# Patient Record
Sex: MALE | Race: BLACK OR AFRICAN AMERICAN
[De-identification: names, ages, dates, MRNs, and addresses within clinical notes are randomized per-mention and may not be internally consistent; named-entity substitution may affect disease eponyms.]

---

## 2017-03-30 ENCOUNTER — HOSPITAL ENCOUNTER (EMERGENCY)
Dept: HOSPITAL 23 - SED | Age: 27
LOS: 1 days | Discharge: HOME | End: 2017-03-31
Payer: COMMERCIAL

## 2017-03-30 DIAGNOSIS — R10.13: ICD-10-CM

## 2017-03-30 DIAGNOSIS — F41.9: ICD-10-CM

## 2017-03-30 DIAGNOSIS — I10: ICD-10-CM

## 2017-03-30 DIAGNOSIS — R00.2: Primary | ICD-10-CM

## 2017-03-30 DIAGNOSIS — R07.9: ICD-10-CM

## 2017-03-30 LAB
BASOPHIL#: 0 X10E3
BASOPHIL%: 0.5 %
BLOOD UREA NITROGEN: 10 MG/DL
BUN/CREATININE RATIO: 9.09
CALCIUM SERUM: 9.6 MG/DL
CK MB SERPL-RTO: 13.2 %
CK MB SERPL-RTO: 33 G/DL
CREATININE SERUM: 1.1 MG/DL
DIFF IND: NO
EOSINOPHIL#: 0 X10E3
EOSINOPHIL%: 0.7 %
GLOM FILT RATE ESTIMATED: 108.3 ML/MIN
GLUCOSE FASTING: 98 MG/DL
HEMATOCRIT: 42.8 %
HEMOGLOBIN: 14.1 GM/DL
KETONES UR QL: 102 MMOL/L
KETONES UR QL: 30 MMOL/L
LYMPHOCYTE#: 2 X10E3
LYMPHOCYTE%: 38.7 %
MEAN CELL VOLUME: 87.6 FL
MEAN CORPUSCULAR HEMOGLOBIN: 28.9 PG
MEAN PLATELET VOLUME: 8.4 FL
METHADONE UR QL SCN: 47.2 NG/ML
METHADONE UR QL SCN: <1 NG/ML
MICRO INDICATED?: NO
MONOCYTE#: 0.5 X10E3
MONOCYTE%: 9.1 %
NEUTROPHIL#: 2.6 X10E3
NEUTROPHIL%: 51 %
PLATELET COUNT: 204 X10E3
POC - TROPONIN: <0.05 NG/ML
POTASSIUM: 4.1 MMOL/L
RED BLOOD COUNT: 4.88 X10E
SODIUM: 138 MMOL/L
URINE APPEARANCE: CLEAR
URINE BILIRUBIN: (no result)
URINE BLOOD: (no result)
URINE COLOR: YELLOW
URINE GLUCOSE: (no result) MG/DL
URINE KETONE: (no result)
URINE LEUKOCYTE ESTERASE: (no result)
URINE NITRATE: (no result)
URINE PH: 7.5
URINE PROTEIN: (no result)
URINE SOURCE: (no result)
URINE SPECIFIC GRAVITY: 1.01
URINE UROBILINOGEN: 0.2 MG/DL
WHITE BLOOD COUNT: 5.1 X10E3

## 2017-04-30 ENCOUNTER — HOSPITAL ENCOUNTER (EMERGENCY)
Dept: HOSPITAL 23 - SED | Age: 27
LOS: 1 days | Discharge: HOME | End: 2017-05-01
Payer: COMMERCIAL

## 2017-04-30 DIAGNOSIS — J06.9: Primary | ICD-10-CM

## 2017-05-25 ENCOUNTER — HOSPITAL ENCOUNTER (EMERGENCY)
Dept: HOSPITAL 23 - SED | Age: 27
Discharge: HOME | End: 2017-05-25
Payer: COMMERCIAL

## 2017-05-25 DIAGNOSIS — K21.9: Primary | ICD-10-CM

## 2017-05-25 DIAGNOSIS — F41.9: ICD-10-CM

## 2017-05-25 LAB
MICRO INDICATED?: NO
URINE APPEARANCE: CLEAR
URINE BILIRUBIN: (no result)
URINE BLOOD: (no result)
URINE COLOR: YELLOW
URINE GLUCOSE: (no result) MG/DL
URINE KETONE: (no result)
URINE LEUKOCYTE ESTERASE: (no result)
URINE NITRATE: (no result)
URINE PH: 6.5 (ref 5–8)
URINE PROTEIN: (no result)
URINE SOURCE: (no result)
URINE SPECIFIC GRAVITY: <=1.005 (ref 1–1.03)
URINE UROBILINOGEN: 0.2 MG/DL

## 2017-07-08 ENCOUNTER — HOSPITAL ENCOUNTER (EMERGENCY)
Dept: HOSPITAL 23 - SED | Age: 27
Discharge: HOME | End: 2017-07-08
Payer: COMMERCIAL
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
## 2017-07-08 DIAGNOSIS — S70.362A: Primary | ICD-10-CM
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
## 2017-07-08 DIAGNOSIS — I10: ICD-10-CM

## 2017-07-08 LAB
BASOPHIL#: 0 X10E3 (ref 0–0.3)
BASOPHIL%: 0.5 % (ref 0–2.5)
BLOOD UREA NITROGEN: 14 MG/DL (ref 9–23)
BUN/CREATININE RATIO: 12.72
CALCIUM SERUM: 9.2 MG/DL (ref 8.4–10.2)
CK MB SERPL-RTO: 13 % (ref 11–15.5)
CK MB SERPL-RTO: 33.6 G/DL (ref 30–36)
CREATININE SERUM: 1.1 MG/DL (ref 0.6–1.4)
DIFF IND: NO
EOSINOPHIL#: 0.1 X10E3 (ref 0–0.7)
EOSINOPHIL%: 1.8 % (ref 0–7)
GLOM FILT RATE ESTIMATED: 108.3 ML/MIN (ref 60–?)
GLUCOSE FASTING: 99 MG/DL (ref 70–110)
HEMATOCRIT: 42.5 % (ref 38–50)
HEMOGLOBIN: 14.3 GM/DL (ref 13–16)
KETONES UR QL: 101 MMOL/L (ref 100–111)
KETONES UR QL: 28 MMOL/L (ref 22–31)
LYMPHOCYTE#: 1.9 X10E3 (ref 1–3.5)
LYMPHOCYTE%: 32.9 % (ref 17–45)
MEAN CELL VOLUME: 87.5 FL (ref 83–96)
MEAN CORPUSCULAR HEMOGLOBIN: 29.4 PG (ref 28–34)
MEAN PLATELET VOLUME: 8.7 FL (ref 6.5–11.5)
MICRO INDICATED?: NO
MONOCYTE#: 0.5 X10E3 (ref 0–1)
MONOCYTE%: 9.5 % (ref 3–12)
NEUTROPHIL#: 3.2 X10E3 (ref 1.5–7.1)
NEUTROPHIL%: 55.3 % (ref 40–75)
PLATELET COUNT: 180 X10E3 (ref 140–420)
POTASSIUM: 4 MMOL/L (ref 3.5–5.1)
RED BLOOD COUNT: 4.85 X10E (ref 3.9–5.6)
SODIUM: 137 MMOL/L (ref 135–145)
URINE APPEARANCE: CLEAR
URINE BILIRUBIN: (no result)
URINE BLOOD: (no result)
URINE COLOR: YELLOW
URINE GLUCOSE: (no result) MG/DL
URINE KETONE: (no result)
URINE LEUKOCYTE ESTERASE: (no result)
URINE NITRATE: (no result)
URINE PH: 7 (ref 5–8)
URINE PROTEIN: (no result)
URINE SOURCE: (no result)
URINE SPECIFIC GRAVITY: 1.01 (ref 1–1.03)
URINE UROBILINOGEN: 0.2 MG/DL
WHITE BLOOD COUNT: 5.8 X10E3 (ref 4–10.5)

## 2017-07-26 ENCOUNTER — APPOINTMENT (OUTPATIENT)
Dept: GENERAL RADIOLOGY | Facility: HOSPITAL | Age: 27
End: 2017-07-26

## 2017-07-26 ENCOUNTER — APPOINTMENT (OUTPATIENT)
Dept: CARDIOLOGY | Facility: HOSPITAL | Age: 27
End: 2017-07-26

## 2017-07-26 ENCOUNTER — HOSPITAL ENCOUNTER (EMERGENCY)
Facility: HOSPITAL | Age: 27
Discharge: HOME | End: 2017-07-26
Attending: EMERGENCY MEDICINE | Admitting: EMERGENCY MEDICINE

## 2017-07-26 VITALS
BODY MASS INDEX: 28.44 KG/M2 | OXYGEN SATURATION: 99 % | WEIGHT: 210 LBS | HEIGHT: 72 IN | DIASTOLIC BLOOD PRESSURE: 73 MMHG | TEMPERATURE: 98.5 F | SYSTOLIC BLOOD PRESSURE: 137 MMHG | RESPIRATION RATE: 16 BRPM | HEART RATE: 95 BPM

## 2017-07-26 DIAGNOSIS — R06.02 SHORTNESS OF BREATH: ICD-10-CM

## 2017-07-26 DIAGNOSIS — R00.2 PALPITATIONS: Primary | ICD-10-CM

## 2017-07-26 LAB
ALBUMIN SERPL-MCNC: 4.8 G/DL (ref 3.5–5.2)
ALBUMIN/GLOB SERPL: 1.6 G/DL
ALP SERPL-CCNC: 68 U/L (ref 39–117)
ALT SERPL W P-5'-P-CCNC: 28 U/L (ref 1–41)
AMPHET+METHAMPHET UR QL: NEGATIVE
ANION GAP SERPL CALCULATED.3IONS-SCNC: 13.4 MMOL/L
AST SERPL-CCNC: 27 U/L (ref 1–40)
BARBITURATES UR QL SCN: NEGATIVE
BASOPHILS # BLD AUTO: 0.02 10*3/MM3 (ref 0–0.2)
BASOPHILS NFR BLD AUTO: 0.2 % (ref 0–1.5)
BENZODIAZ UR QL SCN: NEGATIVE
BILIRUB SERPL-MCNC: 0.4 MG/DL (ref 0.1–1.2)
BILIRUB UR QL STRIP: NEGATIVE
BUN BLD-MCNC: 9 MG/DL (ref 6–20)
BUN/CREAT SERPL: 8 (ref 7–25)
CALCIUM SPEC-SCNC: 9.5 MG/DL (ref 8.6–10.5)
CANNABINOIDS SERPL QL: NEGATIVE
CHLORIDE SERPL-SCNC: 100 MMOL/L (ref 98–107)
CLARITY UR: CLEAR
CO2 SERPL-SCNC: 26.6 MMOL/L (ref 22–29)
COCAINE UR QL: NEGATIVE
COLOR UR: YELLOW
CREAT BLD-MCNC: 1.13 MG/DL (ref 0.76–1.27)
D DIMER PPP FEU-MCNC: <0.27 MCGFEU/ML (ref 0–0.49)
DEPRECATED RDW RBC AUTO: 37.8 FL (ref 37–54)
EOSINOPHIL # BLD AUTO: 0.12 10*3/MM3 (ref 0–0.7)
EOSINOPHIL NFR BLD AUTO: 1.4 % (ref 0.3–6.2)
ERYTHROCYTE [DISTWIDTH] IN BLOOD BY AUTOMATED COUNT: 11.7 % (ref 11.5–14.5)
GFR SERPL CREATININE-BSD FRML MDRD: 97 ML/MIN/1.73
GLOBULIN UR ELPH-MCNC: 3 GM/DL
GLUCOSE BLD-MCNC: 115 MG/DL (ref 65–99)
GLUCOSE UR STRIP-MCNC: NEGATIVE MG/DL
HCT VFR BLD AUTO: 43 % (ref 40.4–52.2)
HGB BLD-MCNC: 14.4 G/DL (ref 13.7–17.6)
HGB UR QL STRIP.AUTO: NEGATIVE
IMM GRANULOCYTES # BLD: 0 10*3/MM3 (ref 0–0.03)
IMM GRANULOCYTES NFR BLD: 0 % (ref 0–0.5)
KETONES UR QL STRIP: NEGATIVE
LEUKOCYTE ESTERASE UR QL STRIP.AUTO: NEGATIVE
LYMPHOCYTES # BLD AUTO: 3 10*3/MM3 (ref 0.9–4.8)
LYMPHOCYTES NFR BLD AUTO: 35.6 % (ref 19.6–45.3)
MCH RBC QN AUTO: 29.8 PG (ref 27–32.7)
MCHC RBC AUTO-ENTMCNC: 33.5 G/DL (ref 32.6–36.4)
MCV RBC AUTO: 89 FL (ref 79.8–96.2)
METHADONE UR QL SCN: NEGATIVE
MONOCYTES # BLD AUTO: 0.61 10*3/MM3 (ref 0.2–1.2)
MONOCYTES NFR BLD AUTO: 7.2 % (ref 5–12)
NEUTROPHILS # BLD AUTO: 4.68 10*3/MM3 (ref 1.9–8.1)
NEUTROPHILS NFR BLD AUTO: 55.6 % (ref 42.7–76)
NITRITE UR QL STRIP: NEGATIVE
OPIATES UR QL: NEGATIVE
OXYCODONE UR QL SCN: NEGATIVE
PH UR STRIP.AUTO: 6.5 [PH] (ref 5–8)
PLATELET # BLD AUTO: 214 10*3/MM3 (ref 140–500)
PMV BLD AUTO: 10.4 FL (ref 6–12)
POTASSIUM BLD-SCNC: 3.5 MMOL/L (ref 3.5–5.2)
PROT SERPL-MCNC: 7.8 G/DL (ref 6–8.5)
PROT UR QL STRIP: NEGATIVE
RBC # BLD AUTO: 4.83 10*6/MM3 (ref 4.6–6)
SODIUM BLD-SCNC: 140 MMOL/L (ref 136–145)
SP GR UR STRIP: 1.02 (ref 1–1.03)
TROPONIN T SERPL-MCNC: <0.01 NG/ML (ref 0–0.03)
UROBILINOGEN UR QL STRIP: NORMAL
WBC NRBC COR # BLD: 8.43 10*3/MM3 (ref 4.5–10.7)

## 2017-07-26 PROCEDURE — 80307 DRUG TEST PRSMV CHEM ANLYZR: CPT | Performed by: PHYSICIAN ASSISTANT

## 2017-07-26 PROCEDURE — 93010 ELECTROCARDIOGRAM REPORT: CPT | Performed by: INTERNAL MEDICINE

## 2017-07-26 PROCEDURE — 84484 ASSAY OF TROPONIN QUANT: CPT | Performed by: PHYSICIAN ASSISTANT

## 2017-07-26 PROCEDURE — 96374 THER/PROPH/DIAG INJ IV PUSH: CPT

## 2017-07-26 PROCEDURE — 85025 COMPLETE CBC W/AUTO DIFF WBC: CPT | Performed by: PHYSICIAN ASSISTANT

## 2017-07-26 PROCEDURE — 93005 ELECTROCARDIOGRAM TRACING: CPT

## 2017-07-26 PROCEDURE — 81003 URINALYSIS AUTO W/O SCOPE: CPT | Performed by: PHYSICIAN ASSISTANT

## 2017-07-26 PROCEDURE — 99283 EMERGENCY DEPT VISIT LOW MDM: CPT

## 2017-07-26 PROCEDURE — 80053 COMPREHEN METABOLIC PANEL: CPT | Performed by: PHYSICIAN ASSISTANT

## 2017-07-26 PROCEDURE — 85379 FIBRIN DEGRADATION QUANT: CPT | Performed by: PHYSICIAN ASSISTANT

## 2017-07-26 PROCEDURE — 71020 HC CHEST PA AND LATERAL: CPT

## 2017-07-26 PROCEDURE — 0296T HC EXT ECG > 48HR TO 21 DAY RCRD W/CONECT INTL RCRD: CPT

## 2017-07-26 RX ORDER — SODIUM CHLORIDE 0.9 % (FLUSH) 0.9 %
10 SYRINGE (ML) INJECTION AS NEEDED
Status: DISCONTINUED | OUTPATIENT
Start: 2017-07-26 | End: 2017-07-26 | Stop reason: HOSPADM

## 2017-07-26 RX ADMIN — SODIUM CHLORIDE 1000 ML: 9 INJECTION, SOLUTION INTRAVENOUS at 03:02

## 2017-07-26 NOTE — DISCHARGE INSTRUCTIONS
Home, rest, follow up with cardiologist and PCP for recheck and further evaluation.  Return to care with further concerns.

## 2017-07-26 NOTE — ED PROVIDER NOTES
EMERGENCY DEPARTMENT ENCOUNTER    CHIEF COMPLAINT  Chief Complaint: Palpitations  History given by: Patient  History limited by:   Room Number: 02/02  PMD: Ashleigh Galaviz MD      HPI:  Pt is a 26 y.o. male who presents complaining of palpitations that onset 1 week ago. Pt describes the palpitations as a fast rate. He also reports some SOA with episodes. Pt denies any previous hx of palpitations, but reports he has a hx of panic attacks and states these sxs are different. Pt denies any abd pain, N/V. He denies any stimulant use.     Duration: 1 week  Onset: gradual  Timing: intermittent  Location: central chest  Radiation: none  Quality: fast  Intensity/Severity: moderate  Progression: unchanged  Associated Symptoms: SOA  Aggravating Factors: none  Alleviating Factors: none  Previous Episodes: hx of panic attacks. Sxs are different  Treatment before arrival: none    PAST MEDICAL HISTORY  Active Ambulatory Problems     Diagnosis Date Noted   • No Active Ambulatory Problems     Resolved Ambulatory Problems     Diagnosis Date Noted   • No Resolved Ambulatory Problems     Past Medical History:   Diagnosis Date   • Hypertension        PAST SURGICAL HISTORY  History reviewed. No pertinent surgical history.    FAMILY HISTORY  History reviewed. No pertinent family history.    SOCIAL HISTORY  Social History     Social History   • Marital status: Single     Spouse name: N/A   • Number of children: N/A   • Years of education: N/A     Occupational History   • Not on file.     Social History Main Topics   • Smoking status: Never Smoker   • Smokeless tobacco: Not on file   • Alcohol use No   • Drug use: No   • Sexual activity: Not on file     Other Topics Concern   • Not on file     Social History Narrative   • No narrative on file       ALLERGIES  Review of patient's allergies indicates no known allergies.    REVIEW OF SYSTEMS  Review of Systems   Constitutional: Negative for activity change, appetite change and fever.   HENT:  Negative for congestion and sore throat.    Eyes: Negative.    Respiratory: Negative for cough and shortness of breath.    Cardiovascular: Positive for palpitations. Negative for chest pain and leg swelling.   Gastrointestinal: Negative for abdominal pain, diarrhea and vomiting.   Endocrine: Negative.    Genitourinary: Negative for decreased urine volume and dysuria.   Musculoskeletal: Negative for neck pain.   Skin: Negative for rash and wound.   Allergic/Immunologic: Negative.    Neurological: Negative for weakness, numbness and headaches.   Hematological: Negative.    Psychiatric/Behavioral: Negative.    All other systems reviewed and are negative.      PHYSICAL EXAM  ED Triage Vitals   Temp Heart Rate Resp BP SpO2   07/26/17 0149 07/26/17 0149 07/26/17 0149 07/26/17 0152 07/26/17 0149   98.1 °F (36.7 °C) 130 15 160/105 99 %      Temp src Heart Rate Source Patient Position BP Location FiO2 (%)   07/26/17 0149 07/26/17 0149 -- -- --   Tympanic Monitor          Physical Exam   Constitutional: He is oriented to person, place, and time and well-developed, well-nourished, and in no distress.   HENT:   Head: Normocephalic and atraumatic.   Eyes: EOM are normal. Pupils are equal, round, and reactive to light.   Neck: Normal range of motion. Neck supple.   Cardiovascular: Regular rhythm and normal heart sounds.  Tachycardia present.    Pulmonary/Chest: Effort normal and breath sounds normal. No respiratory distress.   Abdominal: Soft. There is no tenderness. There is no rebound and no guarding.   Musculoskeletal: Normal range of motion. He exhibits no edema.   Neurological: He is alert and oriented to person, place, and time. He has normal sensation and normal strength.   Skin: Skin is warm and dry.   Psychiatric: Mood and affect normal.   Nursing note and vitals reviewed.      LAB RESULTS  Lab Results (last 24 hours)     Procedure Component Value Units Date/Time    CBC & Differential [[id number]] Collected:  07/26/17  0301    Specimen:  Blood Updated:  07/26/17 0320    Narrative:       The following orders were created for panel order CBC & Differential.  Procedure                               Abnormality         Status                     ---------                               -----------         ------                     CBC Auto Differential[[id number]]        Normal              Final result                 Please view results for these tests on the individual orders.    Comprehensive Metabolic Panel [[id number]]  (Abnormal) Collected:  07/26/17 0301    Specimen:  Blood Updated:  07/26/17 0343     Glucose 115 (H) mg/dL      BUN 9 mg/dL      Creatinine 1.13 mg/dL      Sodium 140 mmol/L      Potassium 3.5 mmol/L      Chloride 100 mmol/L      CO2 26.6 mmol/L      Calcium 9.5 mg/dL      Total Protein 7.8 g/dL      Albumin 4.80 g/dL      ALT (SGPT) 28 U/L      AST (SGOT) 27 U/L      Alkaline Phosphatase 68 U/L      Total Bilirubin 0.4 mg/dL      eGFR  African Amer 97 mL/min/1.73      Globulin 3.0 gm/dL      A/G Ratio 1.6 g/dL      BUN/Creatinine Ratio 8.0     Anion Gap 13.4 mmol/L     D-dimer, Quantitative [[id number]]  (Normal) Collected:  07/26/17 0301    Specimen:  Blood Updated:  07/26/17 0350     D-Dimer, Quantitative <0.27 MCGFEU/mL     Narrative:       The [employer]ago D-Dimer test used in conjunction with a clinical pretest probability (PTP) assessment model, has been approved by the FDA to rule out the presence of venous thromboembolism (VTE) in outpatients suspected of deep venous thrombosis (DVT) or pulmonary embolism (PE).     Troponin [[id number]]  (Normal) Collected:  07/26/17 0301    Specimen:  Blood Updated:  07/26/17 0343     Troponin T <0.010 ng/mL     Narrative:       Troponin T Reference Ranges:  Less than 0.03 ng/mL:    Negative for AMI  0.03 to 0.09 ng/mL:      Indeterminant for AMI  Greater than 0.09 ng/mL: Positive for AMI    CBC Auto Differential [[id number]]  (Normal) Collected:  07/26/17 0301    Specimen:  Blood  Updated:  07/26/17 0320     WBC 8.43 10*3/mm3      RBC 4.83 10*6/mm3      Hemoglobin 14.4 g/dL      Hematocrit 43.0 %      MCV 89.0 fL      MCH 29.8 pg      MCHC 33.5 g/dL      RDW 11.7 %      RDW-SD 37.8 fl      MPV 10.4 fL      Platelets 214 10*3/mm3      Neutrophil % 55.6 %      Lymphocyte % 35.6 %      Monocyte % 7.2 %      Eosinophil % 1.4 %      Basophil % 0.2 %      Immature Grans % 0.0 %      Neutrophils, Absolute 4.68 10*3/mm3      Lymphocytes, Absolute 3.00 10*3/mm3      Monocytes, Absolute 0.61 10*3/mm3      Eosinophils, Absolute 0.12 10*3/mm3      Basophils, Absolute 0.02 10*3/mm3      Immature Grans, Absolute 0.00 10*3/mm3     Urinalysis With / Culture If Indicated [[id number]]  (Normal) Collected:  07/26/17 0306    Specimen:  Urine from Urine, Clean Catch Updated:  07/26/17 0329     Color, UA Yellow     Appearance, UA Clear     pH, UA 6.5     Specific Gravity, UA 1.016     Glucose, UA Negative     Ketones, UA Negative     Bilirubin, UA Negative     Blood, UA Negative     Protein, UA Negative     Leuk Esterase, UA Negative     Nitrite, UA Negative     Urobilinogen, UA 0.2 E.U./dL    Narrative:       Urine microscopic not indicated.    Urine Drug Screen [[id number]]  (Normal) Collected:  07/26/17 0306    Specimen:  Urine from Urine, Clean Catch Updated:  07/26/17 0347     Amphet/Methamphet, Screen Negative     Barbiturates Screen, Urine Negative     Benzodiazepine Screen, Urine Negative     Cocaine Screen, Urine Negative     Opiate Screen Negative     THC, Screen, Urine Negative     Methadone Screen, Urine Negative     Oxycodone Screen, Urine Negative    Narrative:       Negative Thresholds For Drugs Screened:     Amphetamines               500 ng/ml   Barbiturates               200 ng/ml   Benzodiazepines            100 ng/ml   Cocaine                    300 ng/ml   Methadone                  300 ng/ml   Opiates                    300 ng/ml   Oxycodone                  100 ng/ml   THC                         50 ng/ml    The Normal Value for all drugs tested is negative. This report includes final unconfirmed screening results to be used for medical treatment purposes only. Unconfirmed results must not be used for non-medical purposes such as employment or legal testing. Clinical consideration should be applied to any drug of abuse test, particulary when unconfirmed results are used.          I ordered the above labs and reviewed the results    RADIOLOGY  XR Chest 2 View   Preliminary Result   No acute findings.                   I ordered the above noted radiological studies. Interpreted by radiologist.  Reviewed by me in PACS.       PROCEDURES  Procedures  EKG           EKG time: 0224  Rhythm/Rate: Sinus Tachycardia 103BPM  P waves and W[place]: nml  QRS, axis: nml   ST and T waves: ST elevation consistent with early repolarization     Interpreted Contemporaneously by me, independently viewed  No prior EKG for comparison       PROGRESS AND CONSULTS  ED Course   2:25 AM  Ordered Blood work, CXR, and EKG for further evaluation.  3:56 AM  Ordered Zio Patch  3:59 AM  Rechecked with pt. Informed pt of his labs, imaging, and EKG showing nothing remarkable. Discussed with pt about plan to place Zio patch and discharge. Pt understands and agrees with plan. All concerns addressed.     MEDICAL DECISION MAKING      MDM  Number of Diagnoses or Management Options  Palpitations:   Shortness of breath:      Amount and/or Complexity of Data Reviewed  Clinical lab tests: reviewed and ordered  Tests in the radiology section of CPT®: reviewed and ordered  Tests in the medicine section of CPT®: reviewed and ordered (EKG - See EKG procedure note  )  Independent visualization of images, tracings, or specimens: yes           DIAGNOSIS  Final diagnoses:   Palpitations   Shortness of breath       DISPOSITION  DISCHARGE    Patient discharged in stable condition.    Reviewed implications of results, diagnosis, meds, responsibility to follow up,  warning signs and symptoms of possible worsening, potential complications and reasons to return to ER.    Patient/Family voiced understanding of above instructions.    Discussed plan for discharge, as there is no emergent indication for admission.  Pt/family is agreeable and understands need for follow up and repeat testing.  Pt is aware that discharge does not mean that nothing is wrong but it indicates no emergency is present that requires admission and they must continue care with follow-up as given below or physician of their choice.     FOLLOW-UP  Zan Courtney MD  [address] [id number] [phone]    Schedule an appointment as soon as possible for a visit           Medication List      Notice     No changes were made to your prescriptions during this visit.            Latest Documented Vital Signs:  As of 4:25 AM  BP- 155/87 HR- 100 Temp- 98.1 °F (36.7 °C) (Tympanic) O2 sat- 99%    --  Documentation assistance provided by palak Gutierres for Juan Aguirre PA-C.  Information recorded by the scribe was done at my direction and has been verified and validated by me.     Maynor Gutierres  07/26/17 0417       MANDIE Ga  07/26/17 0425

## 2017-07-26 NOTE — ED PROVIDER NOTES
Pt presents to the ED c/o palpitations and SOA that has been intermittent for the past week. Pt states that the episodes have been becoming more frequent. He denies caffeine use. On exam, Pt is resting comfortably, in no distress, and without focal neurologic deficit. Cardio- tachycardiac with a normal rhythm. Lungs are clear. Abd is soft and non tender. Calves are non tender and without edema.     Attestation:  I supervised care provided by the midlevel provider.    We have discussed this patient's history, physical exam, and treatment plan.    I have reviewed the note and personally saw and examined the patient and agree with the plan of care.  I agree with the midlevel provider's findings and plan. I reviewed the midlevel providers note.     Documentation assistance provided by palak Damon for Dr Quintana Information recorded by the palak was done at my direction and has been verified and validated by me.     Janett Damon  07/26/17 0256       Ramiro Quintana MD  07/26/17 0631

## 2017-08-21 PROCEDURE — 0298T ZIO PATCH: CPT | Performed by: INTERNAL MEDICINE

## 2021-04-16 ENCOUNTER — BULK ORDERING (OUTPATIENT)
Dept: CASE MANAGEMENT | Facility: OTHER | Age: 31
End: 2021-04-16

## 2021-04-16 DIAGNOSIS — Z23 IMMUNIZATION DUE: ICD-10-CM
